# Patient Record
Sex: MALE | Race: WHITE | NOT HISPANIC OR LATINO | Employment: FULL TIME | URBAN - METROPOLITAN AREA
[De-identification: names, ages, dates, MRNs, and addresses within clinical notes are randomized per-mention and may not be internally consistent; named-entity substitution may affect disease eponyms.]

---

## 2023-03-03 PROBLEM — K38.9 APPENDICOLITH: Status: ACTIVE | Noted: 2023-03-03

## 2023-03-03 PROBLEM — R10.84 GENERALIZED ABDOMINAL PAIN: Status: ACTIVE | Noted: 2023-03-03

## 2023-03-13 ENCOUNTER — APPOINTMENT (OUTPATIENT)
Dept: PRIMARY CARE | Facility: CLINIC | Age: 23
End: 2023-03-13
Payer: COMMERCIAL

## 2023-03-29 ENCOUNTER — APPOINTMENT (OUTPATIENT)
Dept: PRIMARY CARE | Facility: CLINIC | Age: 23
End: 2023-03-29
Payer: COMMERCIAL

## 2023-04-17 PROBLEM — K35.80 APPENDICITIS, ACUTE: Status: ACTIVE | Noted: 2023-04-17

## 2023-04-17 PROBLEM — K82.9 GALLBLADDER ATTACK: Status: ACTIVE | Noted: 2023-04-17

## 2023-04-17 PROBLEM — R10.9 ABDOMINAL PAIN: Status: ACTIVE | Noted: 2023-04-17

## 2023-04-17 PROBLEM — K80.10 CALCULUS OF GALLBLADDER WITH CHRONIC CHOLECYSTITIS WITHOUT OBSTRUCTION: Status: ACTIVE | Noted: 2023-04-17

## 2023-04-17 RX ORDER — CIPROFLOXACIN 500 MG/1
500 TABLET ORAL 2 TIMES DAILY
COMMUNITY
Start: 2023-03-20 | End: 2023-04-19 | Stop reason: ALTCHOICE

## 2023-04-17 RX ORDER — ONDANSETRON 4 MG/1
8 TABLET, FILM COATED ORAL EVERY 8 HOURS PRN
COMMUNITY
Start: 2023-03-20 | End: 2023-04-19 | Stop reason: ALTCHOICE

## 2023-04-19 ENCOUNTER — OFFICE VISIT (OUTPATIENT)
Dept: PRIMARY CARE | Facility: CLINIC | Age: 23
End: 2023-04-19
Payer: COMMERCIAL

## 2023-04-19 VITALS
HEIGHT: 73 IN | HEART RATE: 67 BPM | OXYGEN SATURATION: 96 % | TEMPERATURE: 96.8 F | SYSTOLIC BLOOD PRESSURE: 127 MMHG | DIASTOLIC BLOOD PRESSURE: 77 MMHG | BODY MASS INDEX: 29.55 KG/M2 | WEIGHT: 223 LBS

## 2023-04-19 DIAGNOSIS — Z00.00 ROUTINE GENERAL MEDICAL EXAMINATION AT A HEALTH CARE FACILITY: Primary | ICD-10-CM

## 2023-04-19 PROCEDURE — 1036F TOBACCO NON-USER: CPT | Performed by: STUDENT IN AN ORGANIZED HEALTH CARE EDUCATION/TRAINING PROGRAM

## 2023-04-19 PROCEDURE — 99385 PREV VISIT NEW AGE 18-39: CPT | Performed by: STUDENT IN AN ORGANIZED HEALTH CARE EDUCATION/TRAINING PROGRAM

## 2023-04-19 SDOH — ECONOMIC STABILITY: FOOD INSECURITY: WITHIN THE PAST 12 MONTHS, YOU WORRIED THAT YOUR FOOD WOULD RUN OUT BEFORE YOU GOT MONEY TO BUY MORE.: NEVER TRUE

## 2023-04-19 SDOH — ECONOMIC STABILITY: FOOD INSECURITY: WITHIN THE PAST 12 MONTHS, THE FOOD YOU BOUGHT JUST DIDN'T LAST AND YOU DIDN'T HAVE MONEY TO GET MORE.: NEVER TRUE

## 2023-04-19 ASSESSMENT — ENCOUNTER SYMPTOMS
UNEXPECTED WEIGHT CHANGE: 0
LOSS OF SENSATION IN FEET: 0
HEADACHES: 0
COUGH: 0
MUSCULOSKELETAL NEGATIVE: 1
FATIGUE: 0
CONSTIPATION: 0
DIZZINESS: 0
PALPITATIONS: 0
WHEEZING: 0
CHILLS: 0
NAUSEA: 0
DIARRHEA: 0
ABDOMINAL PAIN: 0
DEPRESSION: 0
CONFUSION: 0
FEVER: 0
COLOR CHANGE: 0
SHORTNESS OF BREATH: 0
OCCASIONAL FEELINGS OF UNSTEADINESS: 0
VOMITING: 0

## 2023-04-19 ASSESSMENT — PATIENT HEALTH QUESTIONNAIRE - PHQ9
1. LITTLE INTEREST OR PLEASURE IN DOING THINGS: NOT AT ALL
2. FEELING DOWN, DEPRESSED OR HOPELESS: NOT AT ALL
SUM OF ALL RESPONSES TO PHQ9 QUESTIONS 1 & 2: 0

## 2023-04-19 ASSESSMENT — LIFESTYLE VARIABLES: HOW MANY STANDARD DRINKS CONTAINING ALCOHOL DO YOU HAVE ON A TYPICAL DAY: PATIENT DOES NOT DRINK

## 2023-04-19 NOTE — PROGRESS NOTES
"Subjective   Patient ID: Eliud Morales is a 22 y.o. male who presents for New Patient Visit and ER FU visit. He recently had appendectomy (3/9/23) and is doing well except some issues with gallstones; having cholecystectomy in June/23. Reports currently he is doing much better after removing diary from his diet.       Self health assessment: Good     Concern: none     Occupation: , [employer].   Living With: fiance     Sleep: having issues maintaining sleep, using CBD with help.   Exercise: yes,  and wt lifting supervisor   Diet: try eating healthy food   Tobacco: No  Alcohol: Alcohol Use: Yes, patient drinks alcohol. Frequency: social. Amount: social.  Sexually active: Yes     Dentist: 2 mo ago.   Eye doctor: not in long time.   Hearing issues: No    Family history of colon cancer: no  Last colonoscopy & result: N/A  History of abnormal lipids: no    Review of Systems   Constitutional:  Negative for chills, fatigue, fever and unexpected weight change.   HENT: Negative.     Respiratory:  Negative for cough, shortness of breath and wheezing.    Cardiovascular:  Negative for chest pain, palpitations and leg swelling.   Gastrointestinal:  Negative for abdominal pain, constipation, diarrhea, nausea and vomiting.   Musculoskeletal: Negative.    Skin:  Negative for color change and rash.   Neurological:  Negative for dizziness and headaches.   Psychiatric/Behavioral:  Negative for behavioral problems and confusion.         Objective    /77 (BP Location: Right arm, Patient Position: Sitting, BP Cuff Size: Large adult)   Pulse 67   Temp 36 °C (96.8 °F)   Ht 1.854 m (6' 1\")   Wt 101 kg (223 lb)   SpO2 96%   BMI 29.42 kg/m²  Body mass index is 29.42 kg/m².    Physical Exam  Vitals and nursing note reviewed.   Constitutional:       Appearance: Normal appearance.   HENT:      Right Ear: Tympanic membrane normal.      Left Ear: Tympanic membrane normal.   Eyes:      Extraocular " Movements: Extraocular movements intact.      Pupils: Pupils are equal, round, and reactive to light.   Cardiovascular:      Rate and Rhythm: Normal rate and regular rhythm.      Pulses: Normal pulses.      Heart sounds: Normal heart sounds.   Pulmonary:      Effort: Pulmonary effort is normal. No respiratory distress.      Breath sounds: Normal breath sounds.   Abdominal:      General: Abdomen is flat. Bowel sounds are normal.      Palpations: Abdomen is soft.   Musculoskeletal:         General: Normal range of motion.   Neurological:      General: No focal deficit present.      Mental Status: He is alert and oriented to person, place, and time.   Psychiatric:         Mood and Affect: Mood normal.         Behavior: Behavior normal.        Assessment and Plan   He is here to [facility] care and also for annual physical. Overall doing okay, no major concerns today and is clinically & vitally stable. Plan as follows      #HM   Screening tests:  - Lipid profile: will obtained at NOV     Primary prevention:  - Flu shot: utd   - COVID vaccines: utd   - Tdap shot: will get at NOV     Counseling:   - ETOH (age>18): D/ safe drinking habits and advice to cut down on liquor/beers as applies   - Diet, Weight gain: Advise heart healthy diet (low carbs, low fat; add more fruits/veges and whole grain food) and regular exercise 30mins daily x 5 days per week.  Also rec 10mins of aerobic exercise/jogging daily x 5 days/wk  - Rec Ca (600-1200mg) and Vit D (800-1000U) daily     Others:  - Depression screening: Neg, happy appearing male    Eliud was seen today for new patient visit.  Diagnoses and all orders for this visit:  Routine general medical examination at a health care facility (Primary)  Other orders  -     Follow Up In Primary Care; Future   RTC in 1 yr for physical.     Booker Ochoa MD   Family Medicine

## 2023-06-12 ENCOUNTER — HOSPITAL ENCOUNTER (OUTPATIENT)
Dept: DATA CONVERSION | Facility: HOSPITAL | Age: 23
End: 2023-06-12
Attending: SURGERY | Admitting: SURGERY
Payer: COMMERCIAL

## 2023-06-12 DIAGNOSIS — K80.10 CALCULUS OF GALLBLADDER WITH CHRONIC CHOLECYSTITIS WITHOUT OBSTRUCTION: ICD-10-CM

## 2023-06-12 DIAGNOSIS — K80.00 CALCULUS OF GALLBLADDER WITH ACUTE CHOLECYSTITIS WITHOUT OBSTRUCTION: ICD-10-CM

## 2023-07-11 LAB
COMPLETE PATHOLOGY REPORT: NORMAL
CONVERTED CLINICAL DIAGNOSIS-HISTORY: NORMAL
CONVERTED FINAL DIAGNOSIS: NORMAL
CONVERTED FINAL REPORT PDF LINK TO COPY AND PASTE: NORMAL
CONVERTED GROSS DESCRIPTION: NORMAL

## 2023-07-28 ENCOUNTER — TELEPHONE (OUTPATIENT)
Dept: PRIMARY CARE | Facility: CLINIC | Age: 23
End: 2023-07-28
Payer: COMMERCIAL

## 2023-07-28 NOTE — TELEPHONE ENCOUNTER
"Pt called in and has been having sharp intermittent abdominal pain on the right side for several days. Pt described the pain as \"someone stabbing me\". Pt was concerned as pt has his gallbladder and appendix out. Per Erik, advised patient to go to the ER. Pt was agreeable to this. I wanted to make you aware.   "

## 2023-09-07 VITALS — BODY MASS INDEX: 28.63 KG/M2 | HEIGHT: 73 IN | WEIGHT: 216.05 LBS

## 2023-09-30 NOTE — H&P
History & Physical Reviewed:   I have reviewed the History and Physical dated:  24-May-2023   History and Physical reviewed and relevant findings noted. Patient examined to review pertinent physical  findings.: No significant changes   Home Medications Reviewed: no changes noted   Allergies Reviewed: no changes noted       ERAS (Enhanced Recovery After Surgery):  ·  ERAS Patient: no     Consent:   COVID-19 Consent:  ·  COVID-19 Risk Consent Surgeon has reviewed key risks related to the risk of clarissa COVID-19 and if they contract COVID-19 what the risks are.       Electronic Signatures:  Jing Newman)  (Signed 12-Jun-2023 10:25)   Authored: History & Physical Reviewed, ERAS, Consent,  Note Completion      Last Updated: 12-Jun-2023 10:25 by Jing Newman)

## 2023-10-02 NOTE — OP NOTE
PROCEDURE DETAILS    Preoperative Diagnosis:  Calculus of gallbladder with cholecystitis without biliary obstruction, K80.10    Postoperative Diagnosis:  Calculus of gallbladder with cholecystitis without biliary obstruction, K80.10    Surgeon: Jing Newman  Resident/Fellow/Other Assistant: Ritika Damon    Procedure:  1. Laparoscopic Cholecystectomy     Anesthesia: Austin Butterfield  Estimated Blood Loss: 5  Findings: Normal appearance of gallbladder  Specimens(s) Collected: yes,  Gallbladder   Complications: none  IV Fluids: 1200  Patient Returned To/Condition: To PACU in stable condition        Operative Report:   Indication for the procedure: Eliud is a 22-year-old white male who presented with right upper quadrant abdominal pain. Radiographic evaluation showed gallstones.  Preoperative LFTs were within normal limits. The benefits and risks of the laparoscopic cholecystectomy were reviewed with the patient who acknowledged understanding and signed consent.    Details of procedure: The patient was brought into the operating room and was wayne in the supine position. After placement under general anesthesia, ZACHARY hose and SCDs were placed on bilateral lower extremities. The abdomen was prepped with ChloraPrep  and draped in the usual sterile fashion. A pause was taken and the correct patient and procedure were identified.    After injection of half percent Marcaine with epinephrine, a 1 cm periumbilical incision was made just above the level of the umbilicus. This was carried down to the level of the anterior fascia which was grasped with Kochars and opened under direct visualization.   After placement of the Tavera port, and confirmation of intra-abdominal placement, the abdomen was insufflated up to 15 mmHg. On quick examination with the scope, there was no evidence of any injury during entry. The parts of the large and small bowel  that were visualized all appeared within normal limits. The pelvis region  could not be seen. No obvious ventral wall hernias. Attention now was placed towards the right upper quadrant. The liver appeared normal. The patient now was placed in reverse Trendelenburg  with slight right-sided up. All other ports were placed after local anesthesia was injected. A 5 mm port was placed into the epigastrium, and two 5mm ports were placed into the right upper quadrant.    At this time the gallbladder was identified, grasped and retracted in a cephalad position. The infundibulum of the gallbladder was grasped and retracted out laterally. The peritoneum was opened up around the infundibulum of the gallbladder both anteriorly  and posteriorly using blunt dissection. Eventually 2 tubular structures each measuring less than 3 mm were identified as the cystic duct and the cystic artery. The undersurface of the gallbladder was dissected away from the gallbladder fossa bed, and  there did not appear to be any other tubular structures leading up to the level of the gallbladder. At this time the cystic duct and the cystic artery were clipped using a 5 mm clip applier, and both were cut. The gallbladder then was removed from the  gallbladder fossa bed using cautery. No other tubular structures were encountered during the dissection. Once the gallbladder was off of the gallbladder fossa bed, it was removed from the intra-abdominal cavity through the periumbilical port with an Endo  Catch bag as there was a small amount of bilious drainage from the gallbladder.  The gallstone which had fallen out of the gallbladder also was put into the Endo Catch bag.    The abdomen was re-insufflated and the area of the gallbladder fossa bed was inspected. There was no bleeding from the liver bed. The clips appeared in good position. The area both above and below the liver then was irrigated until clear. The ports then  were removed under direct visualization, without any evidence of bleeding from the port sites. The  pneumoperitoneum was released and suctioned out. The fascia of the periumbilical port was closed using figure-of-eight stitch of 0 Vicryl suture. The remaining  Marcaine was injected into the fascia of the periumbilical port. All wounds were closed using interrupted stitches of 4-0 Vicryl and dressed with Steri-Strips and Band-Aids. The patient tolerated the procedure well.    Counts: Correct ×2.  Complications: None.  Drains: None.                        Attestation:   Note Completion:  Attending Attestation I performed the procedure without a resident         Electronic Signatures:  Jing Newman)  (Signed 12-Jun-2023 12:28)   Authored: Post-Operative Note, Chart Review, Note Completion      Last Updated: 12-Jun-2023 12:28 by Jing Newman)

## 2024-02-07 PROBLEM — K35.80 APPENDICITIS, ACUTE: Status: RESOLVED | Noted: 2023-04-17 | Resolved: 2024-02-07

## 2024-02-07 PROBLEM — K80.10 CALCULUS OF GALLBLADDER WITH CHRONIC CHOLECYSTITIS WITHOUT OBSTRUCTION: Status: RESOLVED | Noted: 2023-04-17 | Resolved: 2024-02-07

## 2024-02-07 PROBLEM — R10.9 ABDOMINAL PAIN: Status: RESOLVED | Noted: 2023-04-17 | Resolved: 2024-02-07

## 2024-02-07 PROBLEM — K82.9 GALLBLADDER ATTACK: Status: RESOLVED | Noted: 2023-04-17 | Resolved: 2024-02-07

## 2024-02-08 ENCOUNTER — OFFICE VISIT (OUTPATIENT)
Dept: PRIMARY CARE | Facility: CLINIC | Age: 24
End: 2024-02-08
Payer: COMMERCIAL

## 2024-02-08 ENCOUNTER — LAB (OUTPATIENT)
Dept: LAB | Facility: LAB | Age: 24
End: 2024-02-08
Payer: COMMERCIAL

## 2024-02-08 VITALS
SYSTOLIC BLOOD PRESSURE: 128 MMHG | RESPIRATION RATE: 16 BRPM | DIASTOLIC BLOOD PRESSURE: 77 MMHG | OXYGEN SATURATION: 97 % | TEMPERATURE: 97.2 F | HEART RATE: 86 BPM | WEIGHT: 239.8 LBS | HEIGHT: 73 IN | BODY MASS INDEX: 31.78 KG/M2

## 2024-02-08 DIAGNOSIS — Z11.4 SCREENING FOR HIV (HUMAN IMMUNODEFICIENCY VIRUS): ICD-10-CM

## 2024-02-08 DIAGNOSIS — Z11.59 ENCOUNTER FOR HEPATITIS C SCREENING TEST FOR LOW RISK PATIENT: ICD-10-CM

## 2024-02-08 DIAGNOSIS — L63.9 ALOPECIA AREATA: Primary | ICD-10-CM

## 2024-02-08 DIAGNOSIS — Z13.220 SCREENING, LIPID: ICD-10-CM

## 2024-02-08 DIAGNOSIS — F51.01 PRIMARY INSOMNIA: ICD-10-CM

## 2024-02-08 PROBLEM — K38.9 APPENDICOLITH: Status: RESOLVED | Noted: 2023-03-03 | Resolved: 2024-02-08

## 2024-02-08 LAB
CHOLEST SERPL-MCNC: 160 MG/DL (ref 0–199)
CHOLESTEROL/HDL RATIO: 5
HCV AB SER QL: NONREACTIVE
HDLC SERPL-MCNC: 32.3 MG/DL
HIV 1+2 AB+HIV1 P24 AG SERPL QL IA: NONREACTIVE
LDLC SERPL CALC-MCNC: 90 MG/DL
NON HDL CHOLESTEROL: 128 MG/DL (ref 0–149)
TRIGL SERPL-MCNC: 191 MG/DL (ref 0–149)
VLDL: 38 MG/DL (ref 0–40)

## 2024-02-08 PROCEDURE — 87389 HIV-1 AG W/HIV-1&-2 AB AG IA: CPT

## 2024-02-08 PROCEDURE — 1036F TOBACCO NON-USER: CPT | Performed by: FAMILY MEDICINE

## 2024-02-08 PROCEDURE — 36415 COLL VENOUS BLD VENIPUNCTURE: CPT

## 2024-02-08 PROCEDURE — 99214 OFFICE O/P EST MOD 30 MIN: CPT | Performed by: FAMILY MEDICINE

## 2024-02-08 PROCEDURE — 80061 LIPID PANEL: CPT

## 2024-02-08 PROCEDURE — 86803 HEPATITIS C AB TEST: CPT

## 2024-02-08 RX ORDER — BETAMETHASONE DIPROPIONATE 0.5 MG/G
LOTION TOPICAL 2 TIMES DAILY
Qty: 15 G | Refills: 0 | Status: SHIPPED | OUTPATIENT
Start: 2024-02-08

## 2024-02-08 ASSESSMENT — ENCOUNTER SYMPTOMS
APPETITE CHANGE: 0
ACTIVITY CHANGE: 0
JOINT SWELLING: 0
POLYDIPSIA: 0
ARTHRALGIAS: 0
POLYPHAGIA: 0

## 2024-02-08 NOTE — PROGRESS NOTES
"Subjective   Patient ID: Eliud Morales is a 23 y.o. male who presents for Establish Care (Patient said big spot bald on the back his head.).    New patient     high school, [employer].     Concern about bald spot on back of head. Losing hair started in December. Was small spot and getting worse. No other rashes.     Difficulty getting to sleep since high school. Cannot stay asleep once gets to sleep. No arm and leg pain. Got OTC sleep aid. Does not last but couple hours unless very exhausted. No napping. Daytime sleepiness occasionally, mostly when no sleep for couple days. Snoring when sleeping on back. No complaints from others about stopping breathing, choking in sleep. Does not wake self up snorming. He is hard to wake up then hard for him to get back to sleep.                    Current Outpatient Medications:     betamethasone dipropionate (Diprosone) 0.05 % lotion, Apply topically 2 times a day., Disp: 15 g, Rfl: 0    Patient Active Problem List   Diagnosis    Alopecia areata    Primary insomnia         Review of Systems   Constitutional:  Negative for activity change and appetite change.   Endocrine: Negative for cold intolerance, heat intolerance, polydipsia, polyphagia and polyuria.   Musculoskeletal:  Negative for arthralgias and joint swelling.   Skin:  Negative for rash.       Objective   /77 (BP Location: Left arm, Patient Position: Sitting, BP Cuff Size: Large adult)   Pulse 86   Temp 36.2 °C (97.2 °F) (Temporal)   Resp 16   Ht 1.854 m (6' 1\")   Wt 109 kg (239 lb 12.8 oz)   SpO2 97%   BMI 31.64 kg/m²     Physical Exam  Vitals reviewed.   Constitutional:       General: He is not in acute distress.     Appearance: Normal appearance. He is not ill-appearing.   Neck:      Vascular: No carotid bruit.   Cardiovascular:      Rate and Rhythm: Normal rate and regular rhythm.      Pulses: Normal pulses.      Heart sounds: No murmur heard.  Pulmonary:      Effort: Pulmonary effort is " normal.      Breath sounds: Normal breath sounds.   Musculoskeletal:      Right lower leg: No edema.      Left lower leg: No edema.   Skin:     Findings: No rash.      Comments: 3 cm patch with few broken hairs right occipital. No raised border or scale.    Neurological:      Mental Status: He is alert.   Psychiatric:         Mood and Affect: Mood normal.         Behavior: Behavior normal.         Assessment/Plan   Problem List Items Addressed This Visit       Alopecia areata - Primary     Will start with Desonide lotion for 2 weeks. Derm referral for further eval if not helpful.          Relevant Medications    betamethasone dipropionate (Diprosone) 0.05 % lotion    Other Relevant Orders    Referral to Dermatology    Primary insomnia     Issues with sleep latency, cannot get back to sleep if he wakes up at night. He would like to try medication. Will try Amitriptyline 25 mg at HS.           Other Visit Diagnoses       Encounter for hepatitis C screening test for low risk patient        Relevant Orders    Hepatitis C Antibody    Screening for HIV (human immunodeficiency virus)        Relevant Orders    HIV 1/2 Antigen/Antibody Screen with Reflex to Confirmation    Screening, lipid        Relevant Orders    Lipid Panel              Assessment, plans, tests, and follow up discussed with patient and patient verbalized understanding. Eliud was given an opportunity to ask questions and  any concerns were addressed including but not limited to medication, Follow up, orders. .

## 2024-02-08 NOTE — PATIENT INSTRUCTIONS
Ordered Desonide lotion to apply in thin layer for 2 weeks straight. Wash off hands when done putting on    Amitriptyline 25 mg at bedtime.     Get fasting labs    Follow up at wellness in 4 weeks    Referred to Dermatology.

## 2024-02-08 NOTE — ASSESSMENT & PLAN NOTE
Issues with sleep latency, cannot get back to sleep if he wakes up at night. He would like to try medication. Will try Amitriptyline 25 mg at HS.

## 2024-03-07 ENCOUNTER — OFFICE VISIT (OUTPATIENT)
Dept: PRIMARY CARE | Facility: CLINIC | Age: 24
End: 2024-03-07
Payer: COMMERCIAL

## 2024-03-07 VITALS
SYSTOLIC BLOOD PRESSURE: 121 MMHG | HEIGHT: 73 IN | BODY MASS INDEX: 32.47 KG/M2 | WEIGHT: 245 LBS | OXYGEN SATURATION: 95 % | RESPIRATION RATE: 16 BRPM | DIASTOLIC BLOOD PRESSURE: 78 MMHG | TEMPERATURE: 98.2 F | HEART RATE: 88 BPM

## 2024-03-07 DIAGNOSIS — F51.01 PRIMARY INSOMNIA: ICD-10-CM

## 2024-03-07 DIAGNOSIS — Z00.00 WELL ADULT EXAM: Primary | ICD-10-CM

## 2024-03-07 PROCEDURE — 1036F TOBACCO NON-USER: CPT | Performed by: FAMILY MEDICINE

## 2024-03-07 PROCEDURE — 99395 PREV VISIT EST AGE 18-39: CPT | Performed by: FAMILY MEDICINE

## 2024-03-07 RX ORDER — AMITRIPTYLINE HYDROCHLORIDE 25 MG/1
25 TABLET, FILM COATED ORAL NIGHTLY
Qty: 30 TABLET | Refills: 11 | Status: SHIPPED | OUTPATIENT
Start: 2024-03-07 | End: 2024-04-05 | Stop reason: SDUPTHER

## 2024-03-07 ASSESSMENT — COLUMBIA-SUICIDE SEVERITY RATING SCALE - C-SSRS
6. HAVE YOU EVER DONE ANYTHING, STARTED TO DO ANYTHING, OR PREPARED TO DO ANYTHING TO END YOUR LIFE?: NO
1. IN THE PAST MONTH, HAVE YOU WISHED YOU WERE DEAD OR WISHED YOU COULD GO TO SLEEP AND NOT WAKE UP?: NO
2. HAVE YOU ACTUALLY HAD ANY THOUGHTS OF KILLING YOURSELF?: NO

## 2024-03-07 ASSESSMENT — ENCOUNTER SYMPTOMS
COUGH: 0
SEIZURES: 0
DIARRHEA: 0
LOSS OF SENSATION IN FEET: 0
DIZZINESS: 0
TROUBLE SWALLOWING: 0
POLYPHAGIA: 0
APPETITE CHANGE: 0
FATIGUE: 0
DYSPHORIC MOOD: 0
DECREASED CONCENTRATION: 0
WHEEZING: 0
DEPRESSION: 0
POLYDIPSIA: 0
VOMITING: 0
NERVOUS/ANXIOUS: 0
NAUSEA: 0
BLOOD IN STOOL: 0
OCCASIONAL FEELINGS OF UNSTEADINESS: 0
UNEXPECTED WEIGHT CHANGE: 0
CONSTIPATION: 0
HEADACHES: 0
SHORTNESS OF BREATH: 0
FREQUENCY: 0
ACTIVITY CHANGE: 0
ARTHRALGIAS: 1
DIFFICULTY URINATING: 0
ABDOMINAL PAIN: 0
JOINT SWELLING: 0
PALPITATIONS: 0

## 2024-03-07 ASSESSMENT — PATIENT HEALTH QUESTIONNAIRE - PHQ9
SUM OF ALL RESPONSES TO PHQ9 QUESTIONS 1 AND 2: 0
1. LITTLE INTEREST OR PLEASURE IN DOING THINGS: NOT AT ALL
2. FEELING DOWN, DEPRESSED OR HOPELESS: NOT AT ALL

## 2024-03-07 NOTE — PATIENT INSTRUCTIONS
Amitriptyline 25 mg at bedtime to help with sleep.     Call if not effective.     Follow up 1 yr.

## 2024-03-07 NOTE — PROGRESS NOTES
Subjective   Patient ID: Eliud Morales is a 23 y.o. male who presents for Annual Exam (4 week follow up and Physical exam).    Here for 2 week follo wup and physical.     Referred to derm for alopecia. Started Desonide in the interim. It has not helped the hair growth issue. Gets to see Dermatology on Monday.     Issues getting to sleep. Started Amitriptyline. Here to follow up  . Doing a little better for the last 2 days. He never got the amitriptyline from the pharmacy.   Here for annual wellness exam. Last wellness couple years.     New concerns:  Right knee painful walking up stairs, Has been going on for 2 weeks. Is not sure which leg he leads with. No problems with prolonged sitting. No crepirtus.   Feels: well    Changes  to health/medications since last visit No   Other providers seen since last visit none    Healthy lifestyle habits: Regular exercise: Lot of exercise, lifting 4 times a week. 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                                         Dietary habits: Pretty healthy diet.   Mostly cook for self, additional protein                                        Social connections/relationships good, getting  in June                                        Wears seatbelts Yes                                         Sunscreen Yes                                         Sexual Risk FactorsNo      Health screenings:  PSA not applicable                                  Colon screening not applicable                                  Hep C screening YEs                                  HIV screening yes                                  STI screeningnot applicable                          Health risks: Domestic Violence no                      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                      Smoke detectors Yes                       Carbon monoxide detectors yes                      Gun safety not applicable                      Second-hand Smoke No                 "       Occupational Risks No     Immunizations:  Influenza:  No                             Tdap: 2011                            HPV: unsure                           Pneumonia: not applicable                           Shingrix: no                           COVID: Pfizer times 3               Current Outpatient Medications:     betamethasone dipropionate (Diprosone) 0.05 % lotion, Apply topically 2 times a day., Disp: 15 g, Rfl: 0    amitriptyline (Elavil) 25 mg tablet, Take 1 tablet (25 mg) by mouth once daily at bedtime., Disp: 30 tablet, Rfl: 11    Patient Active Problem List   Diagnosis    Alopecia areata    Primary insomnia         Review of Systems   Constitutional:  Negative for activity change, appetite change, fatigue and unexpected weight change.   HENT:  Negative for dental problem, hearing loss and trouble swallowing.    Eyes:  Negative for visual disturbance.   Respiratory:  Negative for cough, shortness of breath and wheezing.    Cardiovascular:  Negative for chest pain, palpitations and leg swelling.   Gastrointestinal:  Negative for abdominal pain, blood in stool, constipation, diarrhea, nausea and vomiting.   Endocrine: Negative for cold intolerance, heat intolerance, polydipsia, polyphagia and polyuria.   Genitourinary:  Negative for difficulty urinating, frequency, testicular pain and urgency.   Musculoskeletal:  Positive for arthralgias. Negative for joint swelling.   Skin:  Negative for rash.   Neurological:  Negative for dizziness, seizures, syncope and headaches.   Psychiatric/Behavioral:  Negative for behavioral problems, decreased concentration and dysphoric mood. The patient is not nervous/anxious.        Objective   /78 (BP Location: Right arm, Patient Position: Sitting, BP Cuff Size: Adult long)   Pulse 88   Temp 36.8 °C (98.2 °F) (Temporal)   Resp 16   Ht 1.854 m (6' 1\")   Wt 111 kg (245 lb)   SpO2 95%   BMI 32.32 kg/m²     Physical Exam  Constitutional:       Appearance: " Normal appearance.   HENT:      Head: Normocephalic and atraumatic.      Right Ear: Tympanic membrane normal.      Left Ear: Tympanic membrane normal.      Nose: Nose normal.      Mouth/Throat:      Pharynx: Oropharynx is clear.   Eyes:      Extraocular Movements: Extraocular movements intact.      Conjunctiva/sclera: Conjunctivae normal.      Pupils: Pupils are equal, round, and reactive to light.   Neck:      Vascular: No carotid bruit.   Cardiovascular:      Rate and Rhythm: Normal rate and regular rhythm.      Pulses: Normal pulses.      Heart sounds: No murmur heard.  Pulmonary:      Effort: Pulmonary effort is normal.      Breath sounds: Normal breath sounds.   Abdominal:      Palpations: There is no hepatomegaly, splenomegaly or mass.      Tenderness: There is no abdominal tenderness.   Musculoskeletal:         General: Normal range of motion.      Cervical back: Normal range of motion.      Left lower leg: No edema.   Skin:     General: Skin is warm and dry.      Findings: No rash.   Neurological:      General: No focal deficit present.      Mental Status: He is alert. Mental status is at baseline.      Gait: Gait is intact.   Psychiatric:         Mood and Affect: Mood normal.         Thought Content: Thought content normal.         Assessment/Plan   Problem List Items Addressed This Visit       Primary insomnia     Resent Amitriptyline. Let me know if having issues with medication.          Relevant Medications    amitriptyline (Elavil) 25 mg tablet     Other Visit Diagnoses       Well adult exam    -  Primary    Discussed screenings and preventive measures for age. Discussed healthy lifestyle.    Relevant Orders    Follow Up In Primary Care - Health Maintenance              Assessment, plans, tests, and follow up discussed with patient and patient verbalized understanding. Eliud was given an opportunity to ask questions and  any concerns were addressed including but not limited to medication, follow up,  lab results. .

## 2024-04-05 ENCOUNTER — PATIENT MESSAGE (OUTPATIENT)
Dept: PRIMARY CARE | Facility: CLINIC | Age: 24
End: 2024-04-05
Payer: COMMERCIAL

## 2024-04-05 DIAGNOSIS — F51.01 PRIMARY INSOMNIA: ICD-10-CM

## 2024-04-05 NOTE — TELEPHONE ENCOUNTER
From: Eliud Morales  To: Lesli Espinoza MD  Sent: 4/5/2024 3:19 PM EDT  Subject: Prescription Change    Hi Dr. Espinoza,    I was wondering how to get my sleeping prescription changed to [facility]?

## 2024-04-07 RX ORDER — AMITRIPTYLINE HYDROCHLORIDE 25 MG/1
25 TABLET, FILM COATED ORAL NIGHTLY
Qty: 90 TABLET | Refills: 3 | Status: SHIPPED | OUTPATIENT
Start: 2024-04-07 | End: 2025-04-07

## 2024-04-22 ENCOUNTER — APPOINTMENT (OUTPATIENT)
Dept: PRIMARY CARE | Facility: CLINIC | Age: 24
End: 2024-04-22
Payer: COMMERCIAL

## 2025-01-13 ENCOUNTER — OFFICE VISIT (OUTPATIENT)
Dept: URGENT CARE | Age: 25
End: 2025-01-13
Payer: COMMERCIAL

## 2025-01-13 DIAGNOSIS — R51.9 ACUTE NONINTRACTABLE HEADACHE, UNSPECIFIED HEADACHE TYPE: Primary | ICD-10-CM

## 2025-01-13 LAB
POC RAPID INFLUENZA A: NEGATIVE
POC RAPID INFLUENZA B: NEGATIVE
POC SARS-COV-2 AG BINAX: NORMAL

## 2025-01-13 PROCEDURE — 99214 OFFICE O/P EST MOD 30 MIN: CPT | Performed by: NURSE PRACTITIONER

## 2025-01-13 NOTE — PROGRESS NOTES
Urgent Care Virtual Video Visit    Patient Location: home  Provider Location: [facility]      24-year-old male seen via virtual appointment with complaints of headache x 1 day.  Patient reports he had a cough last week which has since resolved.  Reports his entire head hurts.  Denies congestion, sinus pressure, sore throat, ear pain.  States he has tried taking over-the-counter ibuprofen with minimal relief.   Patient denies visual changes, neck pain, dizziness, falls. Recommend COVID and flu testing.  Patient went to [facility] location where he was tested for COVID and flu and both test were negative.  Spoke with patient again via telephone and advised him to use over-the-counter Tylenol or Motrin increase oral fluids, rest, follow-up with PCP.  Patient was advised that if headache is intolerable he should go to the emergency department for further evaluation and treatment. At time of discharge patient was clinically well-appearing and HDS for outpatient management. The patient was educated regarding diagnosis, supportive care, OTC and Rx medications. The patient was given the opportunity to ask questions prior to discharge.  They verbalized understanding of my discussion of the plans for treatment, expected course, indications to return to  or seek further evaluation in ED, and the need for timely follow up as directed.   They were provided with a work/school excuse if requested.       Video visit completed with realtime synchronous video/audio connection. Informed consent was obtained from the patient. Patient was made aware that my evaluation and diagnosis are limited due to the fact that we are not in the same room during the interview and that this is a virtual encounter that took place via videoconferencing. Patient verbalized understanding.     Patient disposition: Home    Electronically signed by ELIEL Rosas  1:27 PM

## 2025-02-18 ENCOUNTER — OFFICE VISIT (OUTPATIENT)
Dept: URGENT CARE | Age: 25
End: 2025-02-18
Payer: COMMERCIAL

## 2025-02-18 VITALS
HEART RATE: 97 BPM | DIASTOLIC BLOOD PRESSURE: 84 MMHG | OXYGEN SATURATION: 97 % | TEMPERATURE: 98 F | SYSTOLIC BLOOD PRESSURE: 127 MMHG

## 2025-02-18 DIAGNOSIS — U07.1 COVID: Primary | ICD-10-CM

## 2025-02-18 DIAGNOSIS — B97.89 SORE THROAT (VIRAL): ICD-10-CM

## 2025-02-18 DIAGNOSIS — J02.8 SORE THROAT (VIRAL): ICD-10-CM

## 2025-02-18 LAB
POC RAPID INFLUENZA A: NEGATIVE
POC RAPID INFLUENZA B: NEGATIVE
POC RAPID STREP: NEGATIVE
POC SARS-COV-2 AG BINAX: ABNORMAL

## 2025-02-18 NOTE — PATIENT INSTRUCTIONS
COVID POSITIVE RAPID: MDM- History and examination consistent with viral illness, COVID 19.  positive rapid antigen test in clinic - no indication for further imaging or antibiotics. No evidence  of sepsis, strep, pneumonia, otitis, bacterial sinusitis or other bacterial infection. Patient is  stable and low risk. Patient educated on cdc quarantine protocols. advised to seek additional  medical treatment immediately if any signs of symptoms worsen. Patient counseled on  supportive measures at home. Patient is encouraged to return to clinic if symptoms change or  worsen and will otherwise follow with PCP. Patient verbalized understanding and agrees with  plan.

## 2025-02-18 NOTE — PROGRESS NOTES
SUBJECTIVE:   Eliud Morales is a 24 y.o. male who complains of congestion, nasal blockage, post nasal drip, dry cough, headache, and pain while swallowing for 4 days. He denies a history of chest pain, shortness of breath, and weakness and denies a history of asthma. Patient denies smoke cigarettes.     OBJECTIVE:  ENT:  General: Vitals noted, ill appearing, no distress, afebrile. Normal phonation, no stridor, no trismus  ENT: TMs clear bilaterally, EACs unremarkable. Mastoids nontender. Posterior oropharynx without erythema, exudate, or swelling. Uvula is in the midline and non-edematous. No Pj's angina.  Neck: Supple. No meningismus through full range of motion. No lymphadenopathy.   Cardiac: Regular rate and rhythm, no murmur.  Lungs: Good aeration throughout. No adventitious breath sounds.   Abdomen: Soft, nontender, nonsurgical throughout. Normoactive bowel sounds.   Extremities: No peripheral edema  Skin: No rash  Neuro: No focal neurologic deficits. NIH score is 0.     ASSESSMENT:   Covid     PLAN:  COVID POSITIVE RAPID: MDM- History and examination consistent with viral illness, COVID 19.  positive rapid antigen test in clinic - no indication for further imaging or antibiotics. No evidence  of sepsis, strep, pneumonia, otitis, bacterial sinusitis or other bacterial infection. Patient is  stable and low risk. Patient educated on cdc quarantine protocols. advised to seek additional  medical treatment immediately if any signs of symptoms worsen. Patient counseled on  supportive measures at home. Patient is encouraged to return to clinic if symptoms change or  worsen and will otherwise follow with PCP. Patient verbalized understanding and agrees with  plan.

## 2025-06-13 ENCOUNTER — APPOINTMENT (OUTPATIENT)
Dept: PRIMARY CARE | Facility: CLINIC | Age: 25
End: 2025-06-13
Payer: COMMERCIAL

## 2025-06-13 VITALS
TEMPERATURE: 97.7 F | HEART RATE: 92 BPM | DIASTOLIC BLOOD PRESSURE: 78 MMHG | BODY MASS INDEX: 32.45 KG/M2 | WEIGHT: 239.6 LBS | OXYGEN SATURATION: 95 % | RESPIRATION RATE: 18 BRPM | SYSTOLIC BLOOD PRESSURE: 119 MMHG | HEIGHT: 72 IN

## 2025-06-13 DIAGNOSIS — Z00.00 GENERAL MEDICAL EXAM: Primary | ICD-10-CM

## 2025-06-13 DIAGNOSIS — F51.01 PRIMARY INSOMNIA: ICD-10-CM

## 2025-06-13 DIAGNOSIS — H61.23 BILATERAL IMPACTED CERUMEN: ICD-10-CM

## 2025-06-13 PROCEDURE — 3008F BODY MASS INDEX DOCD: CPT | Performed by: NURSE PRACTITIONER

## 2025-06-13 PROCEDURE — 99395 PREV VISIT EST AGE 18-39: CPT | Performed by: NURSE PRACTITIONER

## 2025-06-13 PROCEDURE — 1036F TOBACCO NON-USER: CPT | Performed by: NURSE PRACTITIONER

## 2025-06-13 PROCEDURE — 99213 OFFICE O/P EST LOW 20 MIN: CPT | Performed by: NURSE PRACTITIONER

## 2025-06-13 RX ORDER — AMITRIPTYLINE HYDROCHLORIDE 25 MG/1
25 TABLET, FILM COATED ORAL NIGHTLY
Qty: 90 TABLET | Refills: 3 | Status: SHIPPED | OUTPATIENT
Start: 2025-06-13 | End: 2026-06-13

## 2025-06-13 ASSESSMENT — ENCOUNTER SYMPTOMS
SHORTNESS OF BREATH: 0
ABDOMINAL PAIN: 0
NERVOUS/ANXIOUS: 0
PALPITATIONS: 0
WOUND: 0
HEADACHES: 0
ACTIVITY CHANGE: 0
DIZZINESS: 0
RESPIRATORY NEGATIVE: 1
COUGH: 0
APNEA: 0
FEVER: 0
VOMITING: 0
CONFUSION: 0
WEAKNESS: 0
CONSTITUTIONAL NEGATIVE: 1
CHILLS: 0
INSOMNIA: 1
FATIGUE: 0
NAUSEA: 0

## 2025-06-13 ASSESSMENT — PATIENT HEALTH QUESTIONNAIRE - PHQ9
1. LITTLE INTEREST OR PLEASURE IN DOING THINGS: NOT AT ALL
2. FEELING DOWN, DEPRESSED OR HOPELESS: NOT AT ALL
SUM OF ALL RESPONSES TO PHQ9 QUESTIONS 1 AND 2: 0

## 2025-06-13 NOTE — ASSESSMENT & PLAN NOTE
Health Maintenance Topics with due status: Overdue       Topic Date Due    Yearly Adult Physical Never done    MMR Vaccines Never done    Varicella Vaccines Never done    Diabetes Screening Never done    HPV Vaccines 02/19/2017    DTaP/Tdap/Td Vaccines 05/18/2021    COVID-19 Vaccine 09/01/2024     Health Maintenance Topics with due status: Not Due       Topic Last Completion Date    Influenza Vaccine 11/19/2009    Lipid Panel 02/08/2024    Zoster Vaccines Not Due     Health Maintenance Topics with due status: Completed       Topic Last Completion Date    Meningococcal Vaccine 08/19/2016    HIV Screening 02/08/2024    Hepatitis C Screening 02/08/2024     Health Maintenance Topics with due status: Aged Out       Topic Date Due    HIB Vaccines Aged Out    IPV Vaccines Aged Out    Hepatitis A Vaccines Aged Out    Rotavirus Vaccines Aged Out    Pneumococcal Vaccine: Pediatrics and At-Risk Adult Patients Aged Out     Health Maintenance Topics with due status: Discontinued       Topic Date Due    Hepatitis B Vaccines Discontinued

## 2025-06-13 NOTE — PROGRESS NOTES
Subjective   Patient ID: Eliud Morales is a 24 y.o. male who presents for New Patient Visit and Insomnia.    Well visit    23 yo male patient here to establish   New puppy/ dobberman, sleeping pattern off.  Has taken amitriptyline 25 mg at night which helped.  He would like to restart medication  No other issues otherwise    Obesity: BMI 32.5/body type fit      Insomnia  Pertinent negatives include no abdominal pain, chest pain, chills, coughing, fatigue, fever, headaches, nausea, rash, vomiting or weakness.        Review of Systems   Constitutional: Negative.  Negative for activity change, chills, fatigue and fever.   Respiratory: Negative.  Negative for apnea, cough and shortness of breath.    Cardiovascular:  Negative for chest pain and palpitations.   Gastrointestinal:  Negative for abdominal pain, nausea and vomiting.   Skin:  Negative for rash and wound.   Neurological:  Negative for dizziness, weakness and headaches.   Psychiatric/Behavioral:  Negative for confusion. The patient has insomnia. The patient is not nervous/anxious.        Objective   /78   Pulse 92   Temp 36.5 °C (97.7 °F) (Temporal)   Resp 18   Ht 1.829 m (6')   Wt 109 kg (239 lb 9.6 oz)   SpO2 95%   BMI 32.50 kg/m²     Physical Exam  Vitals reviewed.   Constitutional:       Appearance: Normal appearance.   HENT:      Head: Normocephalic.      Right Ear: There is impacted cerumen.      Left Ear: There is impacted cerumen.   Cardiovascular:      Rate and Rhythm: Normal rate and regular rhythm.      Pulses: Normal pulses.      Heart sounds: Normal heart sounds.   Pulmonary:      Effort: Pulmonary effort is normal. No respiratory distress.      Breath sounds: Normal breath sounds. No wheezing.   Abdominal:      General: Bowel sounds are normal.   Neurological:      General: No focal deficit present.      Mental Status: He is alert and oriented to person, place, and time.   Psychiatric:         Mood and Affect: Mood normal.          Behavior: Behavior normal.         Assessment/Plan   Problem List Items Addressed This Visit           ICD-10-CM    Primary insomnia F51.01    Restart amitriptyline 25 mg nightly  Return to office in 1 year  Call for worsening           Relevant Medications    amitriptyline (Elavil) 25 mg tablet    General medical exam - Primary Z00.00    Health Maintenance Topics with due status: Overdue       Topic Date Due    Yearly Adult Physical Never done    MMR Vaccines Never done    Varicella Vaccines Never done    Diabetes Screening Never done    HPV Vaccines 02/19/2017    DTaP/Tdap/Td Vaccines 05/18/2021    COVID-19 Vaccine 09/01/2024     Health Maintenance Topics with due status: Not Due       Topic Last Completion Date    Influenza Vaccine 11/19/2009    Lipid Panel 02/08/2024    Zoster Vaccines Not Due     Health Maintenance Topics with due status: Completed       Topic Last Completion Date    Meningococcal Vaccine 08/19/2016    HIV Screening 02/08/2024    Hepatitis C Screening 02/08/2024     Health Maintenance Topics with due status: Aged Out       Topic Date Due    HIB Vaccines Aged Out    IPV Vaccines Aged Out    Hepatitis A Vaccines Aged Out    Rotavirus Vaccines Aged Out    Pneumococcal Vaccine: Pediatrics and At-Risk Adult Patients Aged Out     Health Maintenance Topics with due status: Discontinued       Topic Date Due    Hepatitis B Vaccines Discontinued            Bilateral impacted cerumen H61.23    Debrox 6.5% otic solution to bilateral ears/twice daily for 4 days  Asymptomatic.  Noted during PE         Relevant Medications    carbamide peroxide (Debrox) 6.5 % otic solution

## 2025-06-13 NOTE — ASSESSMENT & PLAN NOTE
Debrox 6.5% otic solution to bilateral ears/twice daily for 4 days  Asymptomatic.  Noted during PE

## 2025-08-11 ENCOUNTER — APPOINTMENT (OUTPATIENT)
Dept: PRIMARY CARE | Facility: CLINIC | Age: 25
End: 2025-08-11
Payer: COMMERCIAL

## 2025-08-13 ENCOUNTER — APPOINTMENT (OUTPATIENT)
Dept: PRIMARY CARE | Facility: CLINIC | Age: 25
End: 2025-08-13
Payer: COMMERCIAL

## 2025-09-03 ENCOUNTER — APPOINTMENT (OUTPATIENT)
Dept: PRIMARY CARE | Facility: CLINIC | Age: 25
End: 2025-09-03
Payer: COMMERCIAL

## 2025-09-24 ENCOUNTER — APPOINTMENT (OUTPATIENT)
Dept: ORTHOPEDIC SURGERY | Facility: CLINIC | Age: 25
End: 2025-09-24
Payer: COMMERCIAL

## 2026-06-08 ENCOUNTER — APPOINTMENT (OUTPATIENT)
Dept: PRIMARY CARE | Facility: CLINIC | Age: 26
End: 2026-06-08
Payer: COMMERCIAL

## 2026-06-12 ENCOUNTER — APPOINTMENT (OUTPATIENT)
Dept: PRIMARY CARE | Facility: CLINIC | Age: 26
End: 2026-06-12
Payer: COMMERCIAL